# Patient Record
Sex: FEMALE | Race: WHITE | Employment: OTHER | URBAN - METROPOLITAN AREA
[De-identification: names, ages, dates, MRNs, and addresses within clinical notes are randomized per-mention and may not be internally consistent; named-entity substitution may affect disease eponyms.]

---

## 2017-01-11 ENCOUNTER — NURSE NAVIGATOR (OUTPATIENT)
Dept: FAMILY MEDICINE CLINIC | Age: 42
End: 2017-01-11

## 2018-06-18 ENCOUNTER — APPOINTMENT (OUTPATIENT)
Age: 43
DRG: 543 | End: 2018-06-18
Attending: EMERGENCY MEDICINE
Payer: COMMERCIAL

## 2018-06-18 ENCOUNTER — APPOINTMENT (OUTPATIENT)
Dept: GENERAL RADIOLOGY | Age: 43
DRG: 543 | End: 2018-06-18
Attending: EMERGENCY MEDICINE
Payer: COMMERCIAL

## 2018-06-18 ENCOUNTER — APPOINTMENT (OUTPATIENT)
Dept: CT IMAGING | Age: 43
DRG: 543 | End: 2018-06-18
Attending: EMERGENCY MEDICINE
Payer: COMMERCIAL

## 2018-06-18 ENCOUNTER — HOSPITAL ENCOUNTER (INPATIENT)
Age: 43
LOS: 1 days | Discharge: ANOTHER HOSPITAL | DRG: 543 | End: 2018-06-19
Attending: EMERGENCY MEDICINE | Admitting: HOSPITALIST
Payer: COMMERCIAL

## 2018-06-18 DIAGNOSIS — G95.29 SPINAL CORD COMPRESSION DUE TO MALIGNANT NEOPLASM METASTATIC TO SPINE (HCC): Primary | ICD-10-CM

## 2018-06-18 DIAGNOSIS — C79.51 SPINAL CORD COMPRESSION DUE TO MALIGNANT NEOPLASM METASTATIC TO SPINE (HCC): Primary | ICD-10-CM

## 2018-06-18 PROBLEM — C50.919 METASTATIC BREAST CANCER (HCC): Status: ACTIVE | Noted: 2018-06-18

## 2018-06-18 LAB
ANION GAP SERPL CALC-SCNC: 2 MMOL/L (ref 3–18)
BASOPHILS # BLD: 0.1 K/UL (ref 0–0.06)
BASOPHILS NFR BLD: 1 % (ref 0–2)
BUN SERPL-MCNC: 10 MG/DL (ref 7–18)
BUN/CREAT SERPL: 15 (ref 12–20)
CALCIUM SERPL-MCNC: 9 MG/DL (ref 8.5–10.1)
CHLORIDE SERPL-SCNC: 101 MMOL/L (ref 100–108)
CO2 SERPL-SCNC: 31 MMOL/L (ref 21–32)
CREAT SERPL-MCNC: 0.67 MG/DL (ref 0.6–1.3)
DIFFERENTIAL METHOD BLD: ABNORMAL
EOSINOPHIL # BLD: 0.2 K/UL (ref 0–0.4)
EOSINOPHIL NFR BLD: 2 % (ref 0–5)
ERYTHROCYTE [DISTWIDTH] IN BLOOD BY AUTOMATED COUNT: 16.5 % (ref 11.6–14.5)
GLUCOSE SERPL-MCNC: 89 MG/DL (ref 74–99)
HCT VFR BLD AUTO: 31.8 % (ref 35–45)
HGB BLD-MCNC: 10.1 G/DL (ref 12–16)
LYMPHOCYTES # BLD: 0.6 K/UL (ref 0.9–3.6)
LYMPHOCYTES NFR BLD: 8 % (ref 21–52)
MAGNESIUM SERPL-MCNC: 1.8 MG/DL (ref 1.6–2.6)
MCH RBC QN AUTO: 27.1 PG (ref 24–34)
MCHC RBC AUTO-ENTMCNC: 31.8 G/DL (ref 31–37)
MCV RBC AUTO: 85.3 FL (ref 74–97)
MONOCYTES # BLD: 1.1 K/UL (ref 0.05–1.2)
MONOCYTES NFR BLD: 14 % (ref 3–10)
NEUTS SEG # BLD: 6 K/UL (ref 1.8–8)
NEUTS SEG NFR BLD: 75 % (ref 40–73)
PLATELET # BLD AUTO: 567 K/UL (ref 135–420)
PMV BLD AUTO: 8.7 FL (ref 9.2–11.8)
POTASSIUM SERPL-SCNC: 4.8 MMOL/L (ref 3.5–5.5)
RBC # BLD AUTO: 3.73 M/UL (ref 4.2–5.3)
SODIUM SERPL-SCNC: 134 MMOL/L (ref 136–145)
WBC # BLD AUTO: 7.9 K/UL (ref 4.6–13.2)

## 2018-06-18 PROCEDURE — 74177 CT ABD & PELVIS W/CONTRAST: CPT

## 2018-06-18 PROCEDURE — 99284 EMERGENCY DEPT VISIT MOD MDM: CPT

## 2018-06-18 PROCEDURE — A9575 INJ GADOTERATE MEGLUMI 0.1ML: HCPCS | Performed by: EMERGENCY MEDICINE

## 2018-06-18 PROCEDURE — 74011250636 HC RX REV CODE- 250/636: Performed by: EMERGENCY MEDICINE

## 2018-06-18 PROCEDURE — 65270000029 HC RM PRIVATE

## 2018-06-18 PROCEDURE — 71046 X-RAY EXAM CHEST 2 VIEWS: CPT

## 2018-06-18 PROCEDURE — 74011250636 HC RX REV CODE- 250/636: Performed by: HOSPITALIST

## 2018-06-18 PROCEDURE — 74011250636 HC RX REV CODE- 250/636: Performed by: INTERNAL MEDICINE

## 2018-06-18 PROCEDURE — 83735 ASSAY OF MAGNESIUM: CPT | Performed by: EMERGENCY MEDICINE

## 2018-06-18 PROCEDURE — 72157 MRI CHEST SPINE W/O & W/DYE: CPT

## 2018-06-18 PROCEDURE — 74011636320 HC RX REV CODE- 636/320: Performed by: EMERGENCY MEDICINE

## 2018-06-18 PROCEDURE — 74011250637 HC RX REV CODE- 250/637: Performed by: HOSPITALIST

## 2018-06-18 PROCEDURE — 85025 COMPLETE CBC W/AUTO DIFF WBC: CPT | Performed by: EMERGENCY MEDICINE

## 2018-06-18 PROCEDURE — 80048 BASIC METABOLIC PNL TOTAL CA: CPT | Performed by: EMERGENCY MEDICINE

## 2018-06-18 RX ORDER — HEPARIN SODIUM 5000 [USP'U]/ML
5000 INJECTION, SOLUTION INTRAVENOUS; SUBCUTANEOUS EVERY 8 HOURS
Status: DISCONTINUED | OUTPATIENT
Start: 2018-06-18 | End: 2018-06-20 | Stop reason: HOSPADM

## 2018-06-18 RX ORDER — GABAPENTIN 300 MG/1
300 CAPSULE ORAL 3 TIMES DAILY
COMMUNITY

## 2018-06-18 RX ORDER — SODIUM CHLORIDE 9 MG/ML
50 INJECTION, SOLUTION INTRAVENOUS CONTINUOUS
Status: DISCONTINUED | OUTPATIENT
Start: 2018-06-18 | End: 2018-06-20 | Stop reason: HOSPADM

## 2018-06-18 RX ORDER — THERA TABS 400 MCG
1 TAB ORAL DAILY
Status: DISCONTINUED | OUTPATIENT
Start: 2018-06-19 | End: 2018-06-20 | Stop reason: HOSPADM

## 2018-06-18 RX ORDER — ONDANSETRON 2 MG/ML
4 INJECTION INTRAMUSCULAR; INTRAVENOUS
Status: DISCONTINUED | OUTPATIENT
Start: 2018-06-18 | End: 2018-06-20 | Stop reason: HOSPADM

## 2018-06-18 RX ORDER — SODIUM CHLORIDE 9 MG/ML
500 INJECTION, SOLUTION INTRAVENOUS ONCE
Status: COMPLETED | OUTPATIENT
Start: 2018-06-19 | End: 2018-06-18

## 2018-06-18 RX ORDER — IBUPROFEN 400 MG/1
400 TABLET ORAL
Status: DISCONTINUED | OUTPATIENT
Start: 2018-06-18 | End: 2018-06-19

## 2018-06-18 RX ORDER — DEXAMETHASONE SODIUM PHOSPHATE 4 MG/ML
4 INJECTION, SOLUTION INTRA-ARTICULAR; INTRALESIONAL; INTRAMUSCULAR; INTRAVENOUS; SOFT TISSUE
Status: COMPLETED | OUTPATIENT
Start: 2018-06-18 | End: 2018-06-18

## 2018-06-18 RX ORDER — GABAPENTIN 300 MG/1
300 CAPSULE ORAL 3 TIMES DAILY
Status: DISCONTINUED | OUTPATIENT
Start: 2018-06-18 | End: 2018-06-19

## 2018-06-18 RX ORDER — NAPROXEN 250 MG/1
500 TABLET ORAL
Status: DISCONTINUED | OUTPATIENT
Start: 2018-06-18 | End: 2018-06-18

## 2018-06-18 RX ORDER — ACETAMINOPHEN 325 MG/1
650 TABLET ORAL
Status: DISCONTINUED | OUTPATIENT
Start: 2018-06-18 | End: 2018-06-20 | Stop reason: HOSPADM

## 2018-06-18 RX ORDER — DEXAMETHASONE SODIUM PHOSPHATE 4 MG/ML
4 INJECTION, SOLUTION INTRA-ARTICULAR; INTRALESIONAL; INTRAMUSCULAR; INTRAVENOUS; SOFT TISSUE EVERY 6 HOURS
Status: DISCONTINUED | OUTPATIENT
Start: 2018-06-18 | End: 2018-06-20 | Stop reason: HOSPADM

## 2018-06-18 RX ORDER — GADOTERATE MEGLUMINE 376.9 MG/ML
11 INJECTION INTRAVENOUS
Status: COMPLETED | OUTPATIENT
Start: 2018-06-18 | End: 2018-06-18

## 2018-06-18 RX ADMIN — DEXAMETHASONE SODIUM PHOSPHATE 4 MG: 4 INJECTION, SOLUTION INTRAMUSCULAR; INTRAVENOUS at 23:30

## 2018-06-18 RX ADMIN — IBUPROFEN 400 MG: 400 TABLET ORAL at 21:16

## 2018-06-18 RX ADMIN — GABAPENTIN 300 MG: 300 CAPSULE ORAL at 21:16

## 2018-06-18 RX ADMIN — SODIUM CHLORIDE 75 ML/HR: 900 INJECTION, SOLUTION INTRAVENOUS at 21:00

## 2018-06-18 RX ADMIN — IOPAMIDOL 100 ML: 612 INJECTION, SOLUTION INTRAVENOUS at 08:54

## 2018-06-18 RX ADMIN — GADOTERATE MEGLUMINE 11 ML: 376.9 INJECTION INTRAVENOUS at 15:40

## 2018-06-18 RX ADMIN — DEXAMETHASONE SODIUM PHOSPHATE 4 MG: 4 INJECTION, SOLUTION INTRAMUSCULAR; INTRAVENOUS at 14:42

## 2018-06-18 RX ADMIN — SODIUM CHLORIDE 500 ML: 900 INJECTION, SOLUTION INTRAVENOUS at 23:45

## 2018-06-18 NOTE — ED PROVIDER NOTES
EMERGENCY DEPARTMENT HISTORY AND PHYSICAL EXAM    7:50 AM      Date: 6/18/2018  Patient Name: Marcia Deluna    History of Presenting Illness     Chief Complaint   Patient presents with    Back Pain    Numbness     feet         History Provided By: Patient    Chief Complaint: Back Pain   Duration: 2 Weeks  Timing:  Constant  Location: mid back radiated around to rib cage   Quality: Aching  Severity: Moderate  Modifying Factors: Tried Gabapentin and \"light medication\"   Associated Symptoms: numbness (toes), unstable gait       Additional History (Context): Marcia Deluna is a 36 y.o. female with PMHx of hypothyroid, back pain, breast cancer, and pleural effusion who presents with constant moderate aching back pain onset x 2 weeks ago. The pain starts in her mid back and radiates to her rib cage. She has not followed up with a Physician for her back. Tried Gabapentin x 1 week ago and a \"light medication\" (unsure of name), however has had no relief of Sx. Pt thought that her back pain was related to Hx of arthritis. She previously had a CT scan x 2 years ago which showed \"bone thinning\". Associated Sx include numbness in her toes that started \"this morning\" and an unstable gait. Pt states that x 1 day ago her legs became \"wobbly\" and started to TROMSØ out on her\". She states that she now needs assistance with walking. Pt has Hx of breast cancer x 4 years ago and underwent chemotherapy and radiation. She also had her ovaries removed. Pt reports being diagnosed with pleural effusion and she now had drains inserted. Pt states that her last X-rays were x 2 months ago when she was hospitalized for the pleural effusion. Denies abd pain, urinary dysfunction, problems with bowel movements. Denies any further complaints or symptoms at the moment.          Dr. Mylo Ben Dr. Candee Sacks- Oncologist   Dr. Mio Azar- Surgeon   PCP: Luis Encarnacion MD    Current Facility-Administered Medications   Medication Dose Route Frequency Provider Last Rate Last Dose    dexamethasone (DECADRON) 4 mg/mL injection 4 mg  4 mg IntraVENous NOW Cosme Awad MD         Current Outpatient Prescriptions   Medication Sig Dispense Refill    gabapentin (NEURONTIN) 300 mg capsule Take 300 mg by mouth three (3) times daily.  B.infantis-B.ani-B.long-B.bifi (PROBIOTIC 4X) 10-15 mg TbEC Take  by mouth.  naproxen (NAPROSYN) 500 mg tablet Take 1 Tab by mouth two (2) times daily (with meals). 30 Tab 0    multivitamin (ONE A DAY) tablet Take 1 Tab by mouth daily. Past History     Past Medical History:  Past Medical History:   Diagnosis Date    Back pain     Dr. Adelaida edmondson, discogenic low back pain,exercises    Breast CA ([facility] [address] 2014    Dr. Stone Mast Hypothyroid 2010    Pleural effusion     Postmastectomy lymphedema syndrome     right arm       Past Surgical History:  Past Surgical History:   Procedure Laterality Date    HX BREAST REDUCTION      HX  SECTION      x 4    HX MASTECTOMY Right 2014    Dr. Maxim Chaudhari       Family History:  Family History   Problem Relation Age of Onset    Breast Cancer Maternal Aunt     Cancer Mother      ovarian    Alcohol abuse Father     Heart Disease Father     Hypertension Father        Social History:  Social History   Substance Use Topics    Smoking status: Never Smoker    Smokeless tobacco: None    Alcohol use No       Allergies:  No Known Allergies      Review of Systems       Review of Systems   Constitutional: Negative for chills, fatigue and fever. HENT: Negative. Negative for sore throat. Eyes: Negative. Respiratory: Negative for cough and shortness of breath. Cardiovascular: Negative for chest pain and palpitations. Gastrointestinal: Negative for abdominal pain, nausea and vomiting. Genitourinary: Negative for difficulty urinating and dysuria. Musculoskeletal: Positive for back pain and gait problem. Skin: Negative. Neurological: Positive for numbness. Negative for dizziness, weakness, light-headedness and headaches. Psychiatric/Behavioral: Negative. All other systems reviewed and are negative. Physical Exam     Visit Vitals    /69 (BP 1 Location: Left arm)    Pulse 99    Temp 98.1 °F (36.7 °C)    Resp 18    Ht 5' 7\" (1.702 m)    Wt 56.7 kg (125 lb)    SpO2 99%    BMI 19.58 kg/m2         Physical Exam   Constitutional: She is oriented to person, place, and time. She appears well-developed and well-nourished. HENT:   Head: Normocephalic and atraumatic. Mouth/Throat: Oropharynx is clear and moist.   Eyes: Conjunctivae are normal. Pupils are equal, round, and reactive to light. No scleral icterus. Neck: Normal range of motion. Neck supple. No JVD present. No tracheal deviation present. Cardiovascular: Normal rate, regular rhythm and normal heart sounds. Pulmonary/Chest: No respiratory distress. She has no wheezes. Positive for decreased breath sounds at the bases bilaterally. Abdominal: Soft. Bowel sounds are normal.   Musculoskeletal: Normal range of motion. Neurological: She is alert and oriented to person, place, and time. She has normal strength. No cranial nerve deficit. Gait normal. GCS eye subscore is 4. GCS verbal subscore is 5. GCS motor subscore is 6. Pt has decreased muscle strength in legs bilaterally, can lift legs off bed but can't hold them against pressure. Skin: Skin is warm and dry. Psychiatric: She has a normal mood and affect. Nursing note and vitals reviewed.         Diagnostic Study Results     Labs -  Recent Results (from the past 12 hour(s))   METABOLIC PANEL, BASIC    Collection Time: 06/18/18  8:15 AM   Result Value Ref Range    Sodium 134 (L) 136 - 145 mmol/L    Potassium 4.8 3.5 - 5.5 mmol/L    Chloride 101 100 - 108 mmol/L    CO2 31 21 - 32 mmol/L    Anion gap 2 (L) 3.0 - 18 mmol/L    Glucose 89 74 - 99 mg/dL    BUN 10 7.0 - 18 MG/DL    Creatinine 0. 67 0.6 - 1.3 MG/DL    BUN/Creatinine ratio 15 12 - 20      GFR est AA >60 >60 ml/min/1.73m2    GFR est non-AA >60 >60 ml/min/1.73m2    Calcium 9.0 8.5 - 10.1 MG/DL   CBC WITH AUTOMATED DIFF    Collection Time: 06/18/18  8:15 AM   Result Value Ref Range    WBC 7.9 4.6 - 13.2 K/uL    RBC 3.73 (L) 4.20 - 5.30 M/uL    HGB 10.1 (L) 12.0 - 16.0 g/dL    HCT 31.8 (L) 35.0 - 45.0 %    MCV 85.3 74.0 - 97.0 FL    MCH 27.1 24.0 - 34.0 PG    MCHC 31.8 31.0 - 37.0 g/dL    RDW 16.5 (H) 11.6 - 14.5 %    PLATELET 829 (H) 422 - 420 K/uL    MPV 8.7 (L) 9.2 - 11.8 FL    NEUTROPHILS 75 (H) 40 - 73 %    LYMPHOCYTES 8 (L) 21 - 52 %    MONOCYTES 14 (H) 3 - 10 %    EOSINOPHILS 2 0 - 5 %    BASOPHILS 1 0 - 2 %    ABS. NEUTROPHILS 6.0 1.8 - 8.0 K/UL    ABS. LYMPHOCYTES 0.6 (L) 0.9 - 3.6 K/UL    ABS. MONOCYTES 1.1 0.05 - 1.2 K/UL    ABS. EOSINOPHILS 0.2 0.0 - 0.4 K/UL    ABS. BASOPHILS 0.1 (H) 0.0 - 0.06 K/UL    DF AUTOMATED     MAGNESIUM    Collection Time: 06/18/18  8:15 AM   Result Value Ref Range    Magnesium 1.8 1.6 - 2.6 mg/dL       Radiologic Studies -   CT CHEST ABD PELV W CONT   Final Result      XR CHEST PA LAT   Final Result      CT Chest Abd Pelv  IMPRESSION:     1. Extensive adenopathy (mediastinum, axillary, para-aortic, retrocrural,  pericardial, retroperitoneal, mesenteric, and groin regions). If tissue  sampling is desired, the right groin region nodes may be the most readily  accessible nodes for sampling.     - Metastasis vs. myeloproliferative or lymphoproliferative disease.     2.  Lytic lesions in the skeletal structures. Similar differential  considerations.     3. Pleural effusion despite adequate position of the pleural catheters.     4.  Large amount of pericardial effusion. Adenopathy in the pericardial fat  pad. These lesions may be contributing to the pericardial effusion.     5.  Mild pleural effusion.     6.   Of particular note is the spinal canal at T5 and T6 level which appears to  be encroached by the compressive changes as well as mass extending from the  lytic foci within the vertebra into the spinal canal.  MR would be helpful for  further delineation and to assess for the extent of spinal canal encroachment.     7.  Low confidence finding of paucity of enhancement in the hepatic vein despite  enhancement in the iliac veins and IVC. Possibly result of slow portal venous  flow vs. reduced cardiac function vs. hepatic venous thrombosis      CXR   IMPRESSION:     1. Moderate left-sided pleural effusion despite pleural catheter placement. 2.  Minimal right costophrenic angle blunting with the right-sided pleural  catheter placement. 3.  Marked enlargement of the cardiac silhouette. Medical Decision Making   I am the first provider for this patient. I reviewed the vital signs, available nursing notes, past medical history, past surgical history, family history and social history. Vital Signs-Reviewed the patient's vital signs. Pulse Oximetry Analysis -  99 % on RA, normal     Records Reviewed: Nursing Notes (Time of Review: 7:50 AM)    ED Course: Progress Notes, Reevaluation, and Consults:    1:31 PM Consult: I discussed care with Dr. Zeke Barajas (Radiation Oncologist). It was a standard discussion including patient history, chief complaint, available diagnostic results, and predicted treatment course. Recommends hospital admission for further care. 2:23 PM Consult: I discussed care with Dr. Diana Wilkerson (Oncologist). It was a standard discussion including patient history, chief complaint, available diagnostic results, and predicted treatment course. Agrees to consult with patient. 2:26 PM Consult: I discussed care with Dr. Ricci Mayes (Hospitalist). It was a standard discussion including patient history, chief complaint, available diagnostic results, and predicted treatment course. Agrees to accept pt.            Provider Notes (Medical Decision Making):     Diagnosis     Clinical Impression: No diagnosis found. Disposition: admitted. Follow-up Information     None           Patient's Medications   Start Taking    No medications on file   Continue Taking    B. INFANTIS-B. ANI-B. LONG-B.BIFI (PROBIOTIC 4X) 10-15 MG TBEC    Take  by mouth. GABAPENTIN (NEURONTIN) 300 MG CAPSULE    Take 300 mg by mouth three (3) times daily. MULTIVITAMIN (ONE A DAY) TABLET    Take 1 Tab by mouth daily. NAPROXEN (NAPROSYN) 500 MG TABLET    Take 1 Tab by mouth two (2) times daily (with meals). These Medications have changed    No medications on file   Stop Taking    EXEMESTANE PO    Take  by mouth. LEUPROLIDE ACETATE (LUPRON DEPOT IM)    by IntraMUSCular route. LEVOTHYROXINE (SYNTHROID) 150 MCG TABLET    Take  by mouth Daily (before breakfast). MAGNESIUM 250 MG TAB    Take  by mouth.     _______________________________    Attestations:  Scribe Attestation     Baron Petersen (Aj) acting as a scribe for and in the presence of Palmer Dobson MD      June 18, 2018 at 7:50 AM       Provider Attestation:      I personally performed the services described in the documentation, reviewed the documentation, as recorded by the scribe in my presence, and it accurately and completely records my words and actions. June 18, 2018 at 7:50 AM - Palmer Dobson MD    _______________________________    Evangelina Pagan discussed pt with Dr Adelita Ann (oncology) and Dr Donis Castañeda (rad oncology), they both recommend hospital admit for further care and treatment of spinal cord compression from what is assumed to be metastatic disease at this time. Dr Adelita Ann req starting pt on 4mg IV Decadron q 8 hours. Dr Donis Castañeda will evaluate pt in the hospital and will proceed from there. Pt and her  understand and agree with dispo and F/U plan.   Palmer Dobson MD  2:38 PM

## 2018-06-18 NOTE — ED TRIAGE NOTES
Pt presents with back pain x 2-3 weeks and bilateral lower leg and foot numbness x 3 days. Pt states hx of osteoporosis. Pt states numbness is causing her not to be able to walk. Pt ambulated from wheelchair to stretcher without difficulty.

## 2018-06-18 NOTE — PROGRESS NOTES
Problem: Falls - Risk of  Goal: *Absence of Falls  Document Fermin Fall Risk and appropriate interventions in the flowsheet.    Outcome: Progressing Towards Goal  Fall Risk Interventions:  Mobility Interventions: PT Consult for mobility concerns, OT consult for ADLs

## 2018-06-18 NOTE — ED NOTES
Inpatient receiving nurse, Toña Camacho, made aware that patient is requesting PleurX catheter be made available for her stay.

## 2018-06-18 NOTE — H&P
HISTORY & PHYSICAL            Patient: Siva Camp MRN: [id number]  CSN: [id number]    YOB: 1975  Age: 36 y.o. Sex: female    DOA: 6/18/2018 LOS:  LOS: 0 days        DOA: 6/18/2018        Assessment/Plan     Active Problems:    Spinal cord compression due to malignant neoplasm metastatic to spine ([address] (6/18/2018)      Metastatic breast cancer ([address] (6/18/2018)        Plan:  1. Spinal cord compression d2 Metastatic breast ca - Radiation Oncology - Dr Belle Pruitt consulted from ER - will see pt today , Oncology Dr Manuelito Ballesteros also consulted - CT Thorax & MRI thorax reviewed - show extensive cord compression at T5 T6 level - will start IV steroids. Will also benefit from Neurosurgical eval - will defer to primary team but did discuss with pt   2. H/o Metastatic breast cancer with primary in R breast   3.  Malignant Pleural effusion 2y to metastatic cancer - has bilateral pleurex catheters & mentions would need to be drained   DVT px - heparin   FC           Severity of Signs & Symptoms -- Moderate  Risk of adverse events -- High  Current Medical Rx Plan - As Above   Patient history & comorbidities - Per HPI  Discharge Plan -- Home            HPI:     Siva Camp is a 36 y.o. female who is being admitted with bilateral LE weakness -- she mentions that she noticed tingling in her legs which started yesterday, she also feels wobbly when walking-- she has a h/o R Breast cancer -- last Rx in 2014 -- she mentions she was dx'd with mets in 2016 - went to [address] was advised Chemo but tried Alternative Rx   Er eval - CT scan shows T5 T6 mass with spinal cord compression , MRI shows severe cord compression - Radiation Oncology consulted Dr Belle Pruitt , Oncology consulted - Dr Manuelito Ballesteros would benefit from Neurosurg / Spine Surg consult   Will admit to hosp for further eval       Past Medical History:   Diagnosis Date    Back pain     Dr. Charlie Piper prn, discogenic low back pain,exercises    Breast CA ([facility] [address] 12/22/2014     Aleida Zambrano    Hypothyroid 2010    Pleural effusion     Postmastectomy lymphedema syndrome     right arm       Past Surgical History:   Procedure Laterality Date    HX BREAST REDUCTION      HX  SECTION      x 4    HX MASTECTOMY Right 2014    Dr. Teresa Chan       Family History   Problem Relation Age of Onset    Breast Cancer Maternal Aunt     Cancer Mother      ovarian    Alcohol abuse Father     Heart Disease Father     Hypertension Father        Social History     Social History    Marital status:      Spouse name: N/A    Number of children: N/A    Years of education: N/A     Social History Main Topics    Smoking status: Never Smoker    Smokeless tobacco: None    Alcohol use No    Drug use: No    Sexual activity: Not Asked     Other Topics Concern    None     Social History Narrative       Prior to Admission medications    Medication Sig Start Date End Date Taking? Authorizing Provider   gabapentin (NEURONTIN) 300 mg capsule Take 300 mg by mouth three (3) times daily. Yes Phys Other, MD   B.infantis-B.ani-B.long-RDbifi (PROBIOTIC 4X) 10-15 mg TbEC Take  by mouth. Yes Phys Other, MD   naproxen (NAPROSYN) 500 mg tablet Take 1 Tab by mouth two (2) times daily (with meals). 3/2/15  Yes BRINA Sanches   multivitamin (ONE A DAY) tablet Take 1 Tab by mouth daily. Yes Historical Provider       No Known Allergies    Review of Systems  A comprehensive review of systems was negative except for that written in the History of Present Illness. Physical Exam:      Visit Vitals    BP (!) 78/51    Pulse (!) 108    Temp 97.6 °F (36.4 °C)    Resp 18    Ht 5' 7\" (1.702 m)    Wt 56.7 kg (125 lb)    SpO2 95%    Breastfeeding No    BMI 19.58 kg/m2       Physical Exam:    Gen: In general, this is a well nourished female in no acute distress  HEENT: Sclerae nonicteric. Oral mucous membranes moist. Dentition normal  Neck: Supple with midline trachea.    CV: RRR without murmur or rub appreciated. Resp:Respirations are unlabored without use of accessory muscles. Decreased BS bilaterally 2y to Malignant Pleural effusion. Abd: Soft, nontender, nondistended. Extrem: Extremities are warm, without cyanosis or clubbing. No pitting pretibial edema. Skin: Warm, no visible rashes. Neuro: Patient is alert, oriented, and cooperative. No obvious focal defects. Moves all 4 extremities. Labs Reviewed:    Recent Results (from the past 24 hour(s))   METABOLIC PANEL, BASIC    Collection Time: 06/18/18  8:15 AM   Result Value Ref Range    Sodium 134 (L) 136 - 145 mmol/L    Potassium 4.8 3.5 - 5.5 mmol/L    Chloride 101 100 - 108 mmol/L    CO2 31 21 - 32 mmol/L    Anion gap 2 (L) 3.0 - 18 mmol/L    Glucose 89 74 - 99 mg/dL    BUN 10 7.0 - 18 MG/DL    Creatinine 0.67 0.6 - 1.3 MG/DL    BUN/Creatinine ratio 15 12 - 20      GFR est AA >60 >60 ml/min/1.73m2    GFR est non-AA >60 >60 ml/min/1.73m2    Calcium 9.0 8.5 - 10.1 MG/DL   CBC WITH AUTOMATED DIFF    Collection Time: 06/18/18  8:15 AM   Result Value Ref Range    WBC 7.9 4.6 - 13.2 K/uL    RBC 3.73 (L) 4.20 - 5.30 M/uL    HGB 10.1 (L) 12.0 - 16.0 g/dL    HCT 31.8 (L) 35.0 - 45.0 %    MCV 85.3 74.0 - 97.0 FL    MCH 27.1 24.0 - 34.0 PG    MCHC 31.8 31.0 - 37.0 g/dL    RDW 16.5 (H) 11.6 - 14.5 %    PLATELET 366 (H) 915 - 420 K/uL    MPV 8.7 (L) 9.2 - 11.8 FL    NEUTROPHILS 75 (H) 40 - 73 %    LYMPHOCYTES 8 (L) 21 - 52 %    MONOCYTES 14 (H) 3 - 10 %    EOSINOPHILS 2 0 - 5 %    BASOPHILS 1 0 - 2 %    ABS. NEUTROPHILS 6.0 1.8 - 8.0 K/UL    ABS. LYMPHOCYTES 0.6 (L) 0.9 - 3.6 K/UL    ABS. MONOCYTES 1.1 0.05 - 1.2 K/UL    ABS. EOSINOPHILS 0.2 0.0 - 0.4 K/UL    ABS. BASOPHILS 0.1 (H) 0.0 - 0.06 K/UL    DF AUTOMATED     MAGNESIUM    Collection Time: 06/18/18  8:15 AM   Result Value Ref Range    Magnesium 1.8 1.6 - 2.6 mg/dL       Imaging Reviewed:    CT chest / Abd / Pelvis   IMPRESSION:     1.   Extensive adenopathy (mediastinum, axillary, para-aortic, retrocrural,  pericardial, retroperitoneal, mesenteric, and groin regions). If tissue  sampling is desired, the right groin region nodes may be the most readily  accessible nodes for sampling.     - Metastasis vs. myeloproliferative or lymphoproliferative disease.     2.  Lytic lesions in the skeletal structures. Similar differential  considerations.     3. Pleural effusion despite adequate position of the pleural catheters.     4.  Large amount of pericardial effusion. Adenopathy in the pericardial fat  pad. These lesions may be contributing to the pericardial effusion.     5.  Mild pleural effusion.     6. Of particular note is the spinal canal at T5 and T6 level which appears to  be encroached by the compressive changes as well as mass extending from the  lytic foci within the vertebra into the spinal canal.  MR would be helpful for  further delineation and to assess for the extent of spinal canal encroachment.     7.  Low confidence finding of paucity of enhancement in the hepatic vein despite  enhancement in the iliac veins and IVC. Possibly result of slow portal venous  flow vs. reduced cardiac function vs. hepatic venous thrombosis.         MRI Thoracic Spine   IMPRESSION:     1. Expansile osseous metastatic tumor at multiple thoracic levels  -Extensive extra spinous metastatic disease, best evaluated on the CT chest  abdomen pelvis earlier today  2. There is maximal canal compromise, up to severe cord compression, T5 and T6. Presumed high risk for imminent cord vascular compromise/paraparesis  -Significantly lesser tumoral bone encroachment on canal T3  -Tumor encroaches on some exit foramen  -For correlation sagittal scans with radiotherapy, the most collapsed vertebral  body is T5 (65 % height loss); the T3 vertebral body has about 45 % height loss  3. Very probable edema within spinal cord T5-T7     I discussed much of the above with Dr. Wero Mandujano.  Confirmed.     Thank you for this referral.          Marie Shahid MD  6/18/2018, 7:15 PM

## 2018-06-18 NOTE — ED NOTES
Pt request assistance to bathroom via wheelchair. Pt able to transfer to wheelchair without assistance. Pt taken to bathroom via wheelchair.

## 2018-06-19 ENCOUNTER — HOSPITAL ENCOUNTER (OUTPATIENT)
Dept: RADIATION THERAPY | Age: 43
Discharge: HOME | End: 2018-06-19

## 2018-06-19 ENCOUNTER — APPOINTMENT (OUTPATIENT)
Dept: NON INVASIVE DIAGNOSTICS | Age: 43
DRG: 543 | End: 2018-06-19
Attending: INTERNAL MEDICINE
Payer: COMMERCIAL

## 2018-06-19 ENCOUNTER — APPOINTMENT (OUTPATIENT)
Dept: MRI IMAGING | Age: 43
DRG: 543 | End: 2018-06-19
Attending: INTERNAL MEDICINE
Payer: COMMERCIAL

## 2018-06-19 VITALS
HEIGHT: 67 IN | HEART RATE: 99 BPM | WEIGHT: 125 LBS | RESPIRATION RATE: 18 BRPM | DIASTOLIC BLOOD PRESSURE: 77 MMHG | OXYGEN SATURATION: 94 % | BODY MASS INDEX: 19.62 KG/M2 | TEMPERATURE: 97.9 F | SYSTOLIC BLOOD PRESSURE: 116 MMHG

## 2018-06-19 LAB
ALBUMIN SERPL-MCNC: 1.8 G/DL (ref 3.4–5)
ALBUMIN/GLOB SERPL: 0.4 {RATIO} (ref 0.8–1.7)
ALP SERPL-CCNC: 90 U/L (ref 45–117)
ALT SERPL-CCNC: 17 U/L (ref 13–56)
ANION GAP SERPL CALC-SCNC: 10 MMOL/L (ref 3–18)
APTT PPP: 31.4 SEC (ref 23–36.4)
AST SERPL-CCNC: 27 U/L (ref 15–37)
BILIRUB SERPL-MCNC: 0.1 MG/DL (ref 0.2–1)
BUN SERPL-MCNC: 11 MG/DL (ref 7–18)
BUN/CREAT SERPL: 17 (ref 12–20)
CALCIUM SERPL-MCNC: 8.2 MG/DL (ref 8.5–10.1)
CHLORIDE SERPL-SCNC: 105 MMOL/L (ref 100–108)
CHOLEST SERPL-MCNC: 121 MG/DL
CO2 SERPL-SCNC: 25 MMOL/L (ref 21–32)
CREAT SERPL-MCNC: 0.64 MG/DL (ref 0.6–1.3)
ECHO AO ROOT DIAM: 3.41 CM
ECHO IVC PROX: 3 CM
ECHO LA AREA 4C: 11.9 CM2
ECHO LA VOL 2C: 22.65 ML (ref 22–52)
ECHO LA VOL 4C: 25.03 ML (ref 22–52)
ECHO LA VOLUME INDEX A2C: 13.68 ML/M2
ECHO LA VOLUME INDEX A4C: 15.12 ML/M2
ECHO LV INTERNAL DIMENSION DIASTOLIC: 4.47 CM (ref 3.9–5.3)
ECHO LV INTERNAL DIMENSION SYSTOLIC: 2.8 CM
ECHO LV ISOVOLUMETRIC RELAXATION TIME (IVRT): 78 MS
ECHO LV IVSD: 1.12 CM (ref 0.6–0.9)
ECHO LV MASS 2D: 192.4 G (ref 67–162)
ECHO LV MASS INDEX 2D: 116.2 G/M2 (ref 43–95)
ECHO LV POSTERIOR WALL DIASTOLIC: 1.01 CM (ref 0.6–0.9)
ECHO LVOT DIAM: 2.13 CM
ECHO LVOT PEAK GRADIENT: 3.8 MMHG
ECHO LVOT PEAK VELOCITY: 97.15 CM/S
ECHO LVOT SV: 64.3 ML
ECHO LVOT VTI: 18.03 CM
ECHO MV A VELOCITY: 101.36 CM/S
ECHO MV E DECELERATION TIME (DT): 104 MS
ECHO MV E VELOCITY: 0.75 CM/S
ECHO MV E/A RATIO: 0.7
ECHO RV TAPSE: 1.39 CM (ref 1.5–2)
ERYTHROCYTE [DISTWIDTH] IN BLOOD BY AUTOMATED COUNT: 16.7 % (ref 11.6–14.5)
GLOBULIN SER CALC-MCNC: 5.1 G/DL (ref 2–4)
GLUCOSE SERPL-MCNC: 137 MG/DL (ref 74–99)
HCT VFR BLD AUTO: 31 % (ref 35–45)
HDLC SERPL-MCNC: 44 MG/DL (ref 40–60)
HDLC SERPL: 2.8 {RATIO} (ref 0–5)
HGB BLD-MCNC: 10 G/DL (ref 12–16)
LDLC SERPL CALC-MCNC: 63.2 MG/DL (ref 0–100)
LIPID PROFILE,FLP: NORMAL
MCH RBC QN AUTO: 27 PG (ref 24–34)
MCHC RBC AUTO-ENTMCNC: 32.3 G/DL (ref 31–37)
MCV RBC AUTO: 83.8 FL (ref 74–97)
PLATELET # BLD AUTO: 609 K/UL (ref 135–420)
PMV BLD AUTO: 9.3 FL (ref 9.2–11.8)
POTASSIUM SERPL-SCNC: 4.6 MMOL/L (ref 3.5–5.5)
PROT SERPL-MCNC: 6.9 G/DL (ref 6.4–8.2)
RBC # BLD AUTO: 3.7 M/UL (ref 4.2–5.3)
SODIUM SERPL-SCNC: 140 MMOL/L (ref 136–145)
TRIGL SERPL-MCNC: 69 MG/DL (ref ?–150)
VLDLC SERPL CALC-MCNC: 13.8 MG/DL
WBC # BLD AUTO: 10.3 K/UL (ref 4.6–13.2)

## 2018-06-19 PROCEDURE — 80053 COMPREHEN METABOLIC PANEL: CPT | Performed by: HOSPITALIST

## 2018-06-19 PROCEDURE — 93306 TTE W/DOPPLER COMPLETE: CPT

## 2018-06-19 PROCEDURE — 74011250636 HC RX REV CODE- 250/636: Performed by: INTERNAL MEDICINE

## 2018-06-19 PROCEDURE — 86300 IMMUNOASSAY TUMOR CA 15-3: CPT | Performed by: INTERNAL MEDICINE

## 2018-06-19 PROCEDURE — 74011250637 HC RX REV CODE- 250/637: Performed by: HOSPITALIST

## 2018-06-19 PROCEDURE — A9577 INJ MULTIHANCE: HCPCS | Performed by: INTERNAL MEDICINE

## 2018-06-19 PROCEDURE — 74011250636 HC RX REV CODE- 250/636: Performed by: HOSPITALIST

## 2018-06-19 PROCEDURE — 80061 LIPID PANEL: CPT | Performed by: HOSPITALIST

## 2018-06-19 PROCEDURE — 36415 COLL VENOUS BLD VENIPUNCTURE: CPT | Performed by: HOSPITALIST

## 2018-06-19 PROCEDURE — 70553 MRI BRAIN STEM W/O & W/DYE: CPT

## 2018-06-19 PROCEDURE — 85730 THROMBOPLASTIN TIME PARTIAL: CPT | Performed by: HOSPITALIST

## 2018-06-19 PROCEDURE — 85027 COMPLETE CBC AUTOMATED: CPT | Performed by: HOSPITALIST

## 2018-06-19 PROCEDURE — 74011250637 HC RX REV CODE- 250/637: Performed by: INTERNAL MEDICINE

## 2018-06-19 RX ORDER — IBUPROFEN 400 MG/1
400 TABLET ORAL
Status: DISCONTINUED | OUTPATIENT
Start: 2018-06-19 | End: 2018-06-20 | Stop reason: HOSPADM

## 2018-06-19 RX ORDER — DEXAMETHASONE SODIUM PHOSPHATE 4 MG/ML
4 INJECTION, SOLUTION INTRA-ARTICULAR; INTRALESIONAL; INTRAMUSCULAR; INTRAVENOUS; SOFT TISSUE EVERY 6 HOURS
Qty: 1 VIAL | Refills: 0 | Status: SHIPPED
Start: 2018-06-20

## 2018-06-19 RX ORDER — HEPARIN SODIUM 5000 [USP'U]/ML
5000 INJECTION, SOLUTION INTRAVENOUS; SUBCUTANEOUS EVERY 8 HOURS
Qty: 1 ML | Refills: 1 | Status: SHIPPED
Start: 2018-06-19

## 2018-06-19 RX ORDER — ACETAMINOPHEN 325 MG/1
650 TABLET ORAL
Qty: 30 TAB | Refills: 0 | Status: SHIPPED
Start: 2018-06-19

## 2018-06-19 RX ORDER — GABAPENTIN 300 MG/1
300 CAPSULE ORAL EVERY 8 HOURS
Status: DISCONTINUED | OUTPATIENT
Start: 2018-06-19 | End: 2018-06-20 | Stop reason: HOSPADM

## 2018-06-19 RX ORDER — ONDANSETRON 2 MG/ML
4 INJECTION INTRAMUSCULAR; INTRAVENOUS
Qty: 1 VIAL | Refills: 0 | Status: SHIPPED
Start: 2018-06-19

## 2018-06-19 RX ORDER — IBUPROFEN 400 MG/1
400 TABLET ORAL
Qty: 30 TAB | Refills: 1 | Status: SHIPPED
Start: 2018-06-19

## 2018-06-19 RX ADMIN — GADOBENATE DIMEGLUMINE 11 ML: 529 INJECTION, SOLUTION INTRAVENOUS at 18:21

## 2018-06-19 RX ADMIN — GABAPENTIN 300 MG: 300 CAPSULE ORAL at 05:51

## 2018-06-19 RX ADMIN — GABAPENTIN 300 MG: 300 CAPSULE ORAL at 22:27

## 2018-06-19 RX ADMIN — GABAPENTIN 300 MG: 300 CAPSULE ORAL at 13:39

## 2018-06-19 RX ADMIN — DEXAMETHASONE SODIUM PHOSPHATE 4 MG: 4 INJECTION, SOLUTION INTRAMUSCULAR; INTRAVENOUS at 13:39

## 2018-06-19 RX ADMIN — DEXAMETHASONE SODIUM PHOSPHATE 4 MG: 4 INJECTION, SOLUTION INTRAMUSCULAR; INTRAVENOUS at 17:29

## 2018-06-19 RX ADMIN — IBUPROFEN 400 MG: 400 TABLET ORAL at 04:11

## 2018-06-19 RX ADMIN — DEXAMETHASONE SODIUM PHOSPHATE 4 MG: 4 INJECTION, SOLUTION INTRAMUSCULAR; INTRAVENOUS at 05:52

## 2018-06-19 RX ADMIN — SODIUM CHLORIDE 75 ML/HR: 900 INJECTION, SOLUTION INTRAVENOUS at 01:41

## 2018-06-19 NOTE — ROUTINE PROCESS
Rec'd pt resting in bed family at bedside. Dr Huang Dec here to see pt. Both pleurex cath need to be drained. 1945 Obtained a vacuum bottles for pleurex cath- Pt able to drain 800 ml to right chest and 300 ml to left chest. Pt stated she had the pleurex cath inserted last May 2018 at [facility]. Pt drained her own pleurex cath daily at home and she has her own supply ( as pt stated).

## 2018-06-19 NOTE — PROGRESS NOTES
Radiation Oncology    Patient interviewed and examined briefly. Chart and images reviewed. 37 YO multiparous woman diagnosed in 2014 with T2 N1 multifocal right breast cancer. She had neoadjuvant systemic chemotherapy for ER+/UT+/ Her-2/senait negative disease. It progressed with chemotherapy and was ypT3 ypN3 at the time of mastectomy later in 2014. She came for radiation therapy to the right chest wall/tissue expander and regional lymph nodes completing that in Feb 2015. This was followed by reconstruction. She apparently did well for a while stating that her restaging studies later in 2015 showed no evidence of disease. There was local recurrence about one year later. She had a second surgeon care for her and sought second medical oncology opinion at that time. Per her report she did not proceed with standard therapy but had consultation and ongoing treatment with an alternative medicine professional in [place], [place]. Recently, she has had upper thoracic back pain and sudden onset of bilateral lower extremity weakness and paresthesias. CT scan of C/A/P showed upper thoracic vertebral lesions and spinal cord involvement. There is widespread metastatic disease in lymph nodes, pleural effusions, cardiac effusion. MRI thoracic spine confirmed the involvement in the spine and spinal cord compression. I was asked to see her in consultation for palliative radiation therapy to the thoracic spine. I discussed with her and her  that surgical intervention, if possible would be the quickest approach for relief of this acute problem. After that has occurred, she will be a candidate for radiation therapy. If surgery is not possible, palliative radiation therapy alone is the treatment available to her. Dr. Aldo Perez has seen the patient, and she is being transferred to [facility]. Thank you very much for asking my opinion about her care. Full note in RO system.  Ruthie Post

## 2018-06-19 NOTE — PROGRESS NOTES
Care Management Specialist called and spoke with Radha Johns in [facility] team at [phone]. Radha Johns stated they do not schedule same day trips, only if its a emergency. She stated they do not called [facility] for any transportation. Care Management called [facility] and spoke with LIZ and patient is placed on Will Call. [facility] package put together and taken to [facility] for review.

## 2018-06-19 NOTE — CONSULTS
Note dictated. Relapsed breast ca, spine mets, cord compression, stacie pleural effusion and percardial efffusion, chest wall mets. Spine surgery eval. Rad onc to consider XRT to spine meys, steroids. Echo  MRI brain . Obtain recommendations from recent [place] consult Dr Yaz Cai.

## 2018-06-19 NOTE — CONSULTS
[address]  MR#: [id number]  : 1975  ACCOUNT #: [de-identified]   DATE OF SERVICE: 2018    REFERRING PHYSICIAN:  Dean Kwok MD     REASON FOR EVALUATION:  Relapsed breast cancer, spine mets, cord compression. HISTORY OF PRESENT ILLNESS:  The patient is a 79-year-old woman. She has history of stage III right breast cancer diagnosed in . She then presented with locally advanced right breast cancer that was treated with neoadjuvant chemotherapy followed by bilateral mastectomy by Dr. Josef Tyson. She had poor response to treatment. Post-chemotherapy her tumor size was 9.7 cm and 26/28 axillary lymph nodes were positive for metastasis, ER positive, NC positive, HER-2/senait negative. She then was briefly treated with tamoxifen followed by Lupron and bilateral oophorectomy. She had a brief trial of Aromasin post-oophorectomy. She subsequently stopped her followup with her [facility], Dr. Kaity Pearson, and sought attention with 2 other medical practices at [facility] as well as [facility]. She also sought opinion for alternative medicine with an internist in [facility]. More recently, she began experiencing shortness of breath and was noted to have bilateral pleural effusion and underwent PleurX catheter placement at [facility] in 2018. She was seen by surgery and pulmonary physician and had resection of her right cervical lymph node and right femoral lymph node in 2018. Right femoral node showed metastatic carcinoma compatible with breast primary, ER weakly positive, NC negative, HER-2/senait negative. Right cervical lymph node also demonstrating metastatic carcinoma compatible with breast primary, report # [id number].     She presented to the emergency room yesterday complaining of bilateral leg weakness and paresthesias in both her lower extremities extending to the mid torso and upper thoracic level without bladder or bowel incontinence for the past 2 days. Patient underwent CT scan of the chest, abdomen and pelvis as well as an MRI of the thoracic spine that demonstrates extensive spine mets with T5-T6 cord compression, bilateral pleural effusion, extensive retroperitoneal and mesenteric adenopathy as well as pericardial effusion. The patient has been started on steroids overnight. She feels some improvement in her weakness. We have been asked to evaluate and comment in her treatment management. PAST MEDICAL HISTORY:  Stage III right breast cancer diagnosed in , treated by Dr. Albina Ferraro and Dr. Monie Palumbo, underwent neoadjuvant chemotherapy with poor response to treatment, pathologic stage ypT3, 9.7 cm, ypN3a,  lymph nodes positive, ER positive, AL positive, HER-2/senait negative, premenopausal, treated briefly with tamoxifen followed by addition of Lupron and subsequently switched to Aromasin and bilateral salpingo-oophorectomy. The patient lost to followup, had opinions at [facility] as well as [facility], pursued alternative therapy, bilateral PleurX catheter placement for effusion, right femoral and cervical lymph node biopsy, 2018. BRCA mutation negative. Brief trial of Ibrance and Faslodex a year ago with poor response. FAMILY HISTORY:  Father had small cell lung cancer, now . PAST SURGICAL HISTORY:  MediPort catheter and placement, bilateral mastectomy with reconstruction. Bilateral PleurX catheter placement. SOCIAL HISTORY:  , lives locally. No tobacco or alcohol use. Has 5 children. REVIEW OF SYSTEMS:  Shortness of breath, unquantitated weight loss, bilateral leg weakness with paresthesias extending up to the torso. No loss of bladder or bowel control. No headache or vision problems. Denies chest pain. Appropriate mood and affect.     PHYSICAL EXAMINATION:  GENERAL:  Young woman fairly built, appears chronically ill. Hemodynamically stable. HEENT:  Pupils equal, sclerae are anicteric. Oropharynx without stomatitis. NECK:  Supple. SKIN:  Extensive subcutaneous tumor metastasis extending from the neck, anterior chest wall, upper back, breast bilateral mastectomy and implants in place with reconstruction. LUNGS:  Bilateral PleurX catheter placed, reduced intensity of the breath sounds at lung bases. CARDIOVASCULAR:  First and second heart sound audible. ABDOMEN:  Soft. NEUROLOGIC:  Grade IV power in the lower extremities  Paresthesias below T5 level dermatome. LABORATORY DATA:  CT chest, abdomen and pelvis 06/18/2018, moderate pleural effusion bilaterally, near complete atelectasis left lower lobe, large pericardial effusion, extensive adenopathy in the mediastinum, left axilla, retrocrural, retroperitoneal and mesenteric group. Mild ascites. Heterogeneous liver pattern, extensive lytic lesions in multiple bones including manubrium, sternum, the entire spine at T5-T6 level. Soft tissue mass encroaching onto the spinal cord and the spinal canal.  MRI of the thoracic spine 06/18/2018, extensive spinal metastasis, tumor encroaching the spinal cord at T5-6 level as well as T7-T8 level and to a lesser extent at T3 level. ASSESSMENT:  1.  Relapsed progressive breast cancer with spinal cord compression and bilateral paraparesis. 2.  Bilateral pleural effusion. PleurX catheter in place. 3.  Concerns for pericardial effusion. 4.  Prior history of ER positive, LA positive, HER-2/senait negative breast cancer with poor response to neoadjuvant chemotherapy. 5.  More recent biopsy is weakly ER positive, LA negative, HER-2/senait negative. RECOMMENDATIONS:  I have reviewed the above with the patient. Steroids will continue. We will await neurosurgery evaluation. If she is not a candidate for surgery, palliative radiation to the spine mets to consider.     We will add echocardiogram to assess the pericardial effusion. We will also recommend MRI of the brain to be completed. Tumor marker CA 27-29 or CA 15-3 levels will be checked. Patient would like for consideration of systemic therapy per recent [place] recommendations from her 05/2018 visit. We will obtain records from her visit with Dr. Juliann Abbasi. Overall, prognosis guarded. Thank you, Dr. Avis Freitas, for requesting oncology evaluation.       MD FRANCIA Maravilla / YULIYA  D: 06/19/2018 08:54     T: 06/19/2018 09:31  JOB #: [id number]

## 2018-06-19 NOTE — PROGRESS NOTES
MEWS3 BP 81/54. Pt is asymptomatic. Trying to reach DR Yost x3.  Will continue to monitor pt.    2343 Dr Mace Homans ordered IV bolus 500 ml  And will recheck BP.  0008 BP 93/58

## 2018-06-19 NOTE — DISCHARGE SUMMARY
[facility]  [facility] Summary       Patient: Yoli Iyer Age: 36 y.o. : 1975 MR#: [id number] SSN: [id number]  PCP on record: Pernell Kasper MD  Admit date: 2018  Discharge date: 2018    Consults:  Dr Dieudonne Sloan [place]- spinal surgery  - JACKI Sanchez,-heme/onc  -MATTHEW Mckenzie,- radiation oncology  Procedures: none  -     Significant Diagnostic Studies: MRI 18:  1. Expansile osseous metastatic tumor at multiple thoracic levels  -Extensive extra spinous metastatic disease, best evaluated on the CT chest  abdomen pelvis earlier today  2. There is maximal canal compromise, up to severe cord compression, T5 and T6. Presumed high risk for imminent cord vascular compromise/paraparesis  -Significantly lesser tumoral bone encroachment on canal T3  -Tumor encroaches on some exit foramen  -For correlation sagittal scans with radiotherapy, the most collapsed vertebral  body is T5 (65 % height loss); the T3 vertebral body has about 45 % height loss  3. Very probable edema within spinal cord T5-T7       -  CT chest abd pelvis 18  1. Extensive adenopathy (mediastinum, axillary, para-aortic, retrocrural,  pericardial, retroperitoneal, mesenteric, and groin regions). If tissue  sampling is desired, the right groin region nodes may be the most readily  accessible nodes for sampling.     - Metastasis vs. myeloproliferative or lymphoproliferative disease.     2.  Lytic lesions in the skeletal structures. Similar differential  considerations.     3. Pleural effusion despite adequate position of the pleural catheters.     4.  Large amount of pericardial effusion. Adenopathy in the pericardial fat  pad. These lesions may be contributing to the pericardial effusion.     5.  Mild pleural effusion.     6.   Of particular note is the spinal canal at T5 and T6 level which appears to  be encroached by the compressive changes as well as mass extending from the  lytic foci within the vertebra into the spinal canal.  MR would be helpful for  further delineation and to assess for the extent of spinal canal encroachment.     7.  Low confidence finding of paucity of enhancement in the hepatic vein despite  enhancement in the iliac veins and IVC. Possibly result of slow portal venous  flow vs. reduced cardiac function vs. hepatic venous thrombosis. -Cardiac echo  06/19/18  · Estimated left ventricular ejection fraction is 61 - 65%. Normal left ventricular wall motion, no regional wall motion abnormality noted. Age-appropriate left ventricular diastolic function. · Pericardial effusion measures 2.6 cm (anterior) and 1.7 cm (posterior). Bilateral pleural effusion. · No indications of tamponade present      Moderate sized pericardial effusion. Discharge Diagnoses:                                           Patient Active Problem List   Diagnosis Code    Hypothyroid E03.9    Spinal cord compression due to malignant neoplasm metastatic to spine ([facility] [address] G95.20, C79.51    Metastatic breast cancer [facility]) [address] by Problem     36year old female w/ h/o stage III breast cancer diagnosed in 2014now on presented to the ED w/ c/o bilateral lower extremity weakness that started a few days ago. Also c/o numnbess at times from her lower abdomen down, at times below the knees bilaterally. Denied upper ext weakness/numbness. Imaging studies revealed  \"maximal canal compromise, up to severe cord compression, T5 and T6. Presumed high risk for imminent cord vascular compromise/paraparesis  -Significantly lesser tumoral bone encroachment on canal T3\" as pasted above also. Imaging studies also revealed significant pericardial effusion and pt had cardiac echo w/ results as pasted above. Discussed w/ cards pa, no need for intervention prior to transfer, no evidence of cardiac tamponade at this time.  She will need close f/u of this issue perioperatively and may need cardiology eval prior to surgery. She is currently getting treatment w/ decadron IV. Pt was seen by radiation onc and spinal surgeon w/ recs made for surgical intervention. Pt opted to be transferred to [facility]. She of note has h/o bilateral pleural effusion and underwent pleurX catheter placment at [facility] , CXR revealed during this admission that pt has  \"Intermediate bore pleural catheter are in place bilaterally. Despite the  pleural catheter, there is widening of the pleural space in the left hemithorax,  measuring up to about 1.7 cm. There is opacification of the retrocardiac region  and left lower lung zone. Minimal right costophrenic angle blunting is also  Observed. \"      Despite above findings, however, pt is stable. No evidence of respiratory compromise, she's had a few low bp readings earlier during her stay here but has since had nl bp readings.     Today's examination of the patient revealed:     Subjective:     Objective:   VS:   Visit Vitals    /66 (BP 1 Location: Left arm, BP Patient Position: At rest)    Pulse 97    Temp 99.3 °F (37.4 °C)    Resp 18    Ht 5' 7\" (1.702 m)    Wt 56.7 kg (125 lb)    SpO2 96%    Breastfeeding No    BMI 19.58 kg/m2      Tmax/24hrs: Temp (24hrs), Av.7 °F (36.5 °C), Min:97 °F (36.1 °C), Max:99.3 °F (37.4 °C)     Input/Output:   Intake/Output Summary (Last 24 hours) at 18 1738  Last data filed at 18 0008   Gross per 24 hour   Intake              620 ml   Output             1100 ml   Net             -480 ml       General:  Alert, awake, in nad  Cardiovascular:  Rrr, no murmurs  Pulmonary:  ctab  GI:  Soft, nt, nd  Extremities: no edema   Additional:  No focal findings on neuro exam    Labs:    Recent Results (from the past 24 hour(s))   ECHO ADULT COMPLETE    Collection Time: 18 10:33 AM   Result Value Ref Range    LVIDd 4.47 3.9 - 5.3 cm    LVPWd 1.01 (A) 0.6 - 0.9 cm    LVIDs 2.80 cm    IVSd 1.12 (A) 0.6 - 0.9 cm    LVOT d 2.13 cm LVOT Peak Velocity 97.15 cm/s    LVOT Peak Gradient 3.8 mmHg    LVOT VTI 18.03 cm    Left Ventricle Isovolumic Relaxation Time 78.0 ms    MV A Jake 101.36 cm/s    MV E Jake 0.75 cm/s    MV E/A 0.7     LA Vol 4C 25.03 22 - 52 mL    LA Vol 2C 22.65 22 - 52 mL    LA Area 4C 11.9 cm2    LV Mass .4 (A) 67 - 162 g    LV Mass AL Index 116.2 (A) 43 - 95 g/m2    Mitral Valve E Wave Deceleration Time 104.0 ms    Tapse 1.39 (A) 1.5 - 2.0 cm    LVOT SV 64.3 ml    Ao Root D 3.41 cm    LA Vol Index 13.68 ml/m2    LA Vol Index 15.12 ml/m2    IVC proximal 8.90 cm   METABOLIC PANEL, COMPREHENSIVE    Collection Time: 06/19/18 10:40 AM   Result Value Ref Range    Sodium 140 136 - 145 mmol/L    Potassium 4.6 3.5 - 5.5 mmol/L    Chloride 105 100 - 108 mmol/L    CO2 25 21 - 32 mmol/L    Anion gap 10 3.0 - 18 mmol/L    Glucose 137 (H) 74 - 99 mg/dL    BUN 11 7.0 - 18 MG/DL    Creatinine 0.64 0.6 - 1.3 MG/DL    BUN/Creatinine ratio 17 12 - 20      GFR est AA >60 >60 ml/min/1.73m2    GFR est non-AA >60 >60 ml/min/1.73m2    Calcium 8.2 (L) 8.5 - 10.1 MG/DL    Bilirubin, total 0.1 (L) 0.2 - 1.0 MG/DL    ALT (SGPT) 17 13 - 56 U/L    AST (SGOT) 27 15 - 37 U/L    Alk.  phosphatase 90 45 - 117 U/L    Protein, total 6.9 6.4 - 8.2 g/dL    Albumin 1.8 (L) 3.4 - 5.0 g/dL    Globulin 5.1 (H) 2.0 - 4.0 g/dL    A-G Ratio 0.4 (L) 0.8 - 1.7     LIPID PANEL    Collection Time: 06/19/18 10:40 AM   Result Value Ref Range    LIPID PROFILE          Cholesterol, total 121 <200 MG/DL    Triglyceride 69 <150 MG/DL    HDL Cholesterol 44 40 - 60 MG/DL    LDL, calculated 63.2 0 - 100 MG/DL    VLDL, calculated 13.8 MG/DL    CHOL/HDL Ratio 2.8 0 - 5.0     CBC W/O DIFF    Collection Time: 06/19/18 10:40 AM   Result Value Ref Range    WBC 10.3 4.6 - 13.2 K/uL    RBC 3.70 (L) 4.20 - 5.30 M/uL    HGB 10.0 (L) 12.0 - 16.0 g/dL    HCT 31.0 (L) 35.0 - 45.0 %    MCV 83.8 74.0 - 97.0 FL    MCH 27.0 24.0 - 34.0 PG    MCHC 32.3 31.0 - 37.0 g/dL    RDW 16.7 (H) 11.6 - 14.5 %    PLATELET 526 (H) 265 - 420 K/uL    MPV 9.3 9.2 - 11.8 FL   PTT    Collection Time: 06/19/18 10:40 AM   Result Value Ref Range    aPTT 31.4 23.0 - 36.4 SEC     Additional Data Reviewed:     Condition: stable  Disposition:    []Home   []Home with Home Health   []SNF/NH   []Rehab   []Home with family   []Alternate Facility:___uva_________________      Discharge Medications:     Current Discharge Medication List      START taking these medications    Details   acetaminophen (TYLENOL) 325 mg tablet Take 2 Tabs by mouth every six (6) hours as needed. Qty: 30 Tab, Refills: 0      dexamethasone (DECADRON) 4 mg/mL injection 1 mL by IntraVENous route every six (6) hours. Qty: 1 Vial, Refills: 0      heparin sodium,porcine (HEPARIN, PORCINE,) 5,000 unit/mL injection 1 mL by SubCUTAneous route every eight (8) hours. Qty: 1 mL, Refills: 1      ibuprofen (MOTRIN) 400 mg tablet Take 1 Tab by mouth every eight (8) hours as needed. Qty: 30 Tab, Refills: 1      ondansetron (ZOFRAN) 4 mg/2 mL soln 2 mL by IntraVENous route every six (6) hours as needed. Qty: 1 Vial, Refills: 0         CONTINUE these medications which have NOT CHANGED    Details   gabapentin (NEURONTIN) 300 mg capsule Take 300 mg by mouth three (3) times daily. B.infantis-B.ani-B.long-B.bifi (PROBIOTIC 4X) 10-15 mg TbEC Take  by mouth. naproxen (NAPROSYN) 500 mg tablet Take 1 Tab by mouth two (2) times daily (with meals). Qty: 30 Tab, Refills: 0      multivitamin (ONE A DAY) tablet Take 1 Tab by mouth daily.                Follow-up Appointments:       >30 minutes spent coordinating this discharge (review instructions/follow-up, prescriptions, preparing report for sign off)    Signed:  Dean Rizvi MD  6/19/2018  5:38 PM

## 2018-06-19 NOTE — PROGRESS NOTES
MEWS 4 BP 78/51  Pt is asymptomatic. Dr Mikie Bojorquez made aware no further order rec'd. Will continue to monitor pt.

## 2018-06-19 NOTE — PROGRESS NOTES
Dr. Clarice Faustin at [facility] contacted. Agreed to accept transfer of patient given complexity of case. Will arrange transfer.

## 2018-06-19 NOTE — ROUTINE PROCESS
Bedside shift change report given to Sebastian Lombardi RN (oncoming nurse) by Navid Alvarado (offgoing nurse). Report given with SBAR, Kardex, Intake/Output, MAR and Recent Results.

## 2018-06-19 NOTE — CONSULTS
Consult    Patient: Amber Jernigan MRN: [id number]  SSN: [id number]    YOB: 1975  Age: 36 y.o. Sex: female      Subjective:      Amber Jernigan is a 36 y.o. female who is being seen for metastatic breat cancer. Presented last night with weakness in lower extremities. Studies demonstrate severe compression at t5/6 .  Multiple mets at other levels, bilateral pleural effusions, pericardial effusion, massive lymphadenopathy, minimal pain, no bowel or bladder, 4 year history of progressive CA, has bilateral pleural catheters    Past Medical History:   Diagnosis Date    Back pain     Dr. Lawyer Joby edmondson, discogenic low back pain,exercises    Breast CA ([address] 2014    Dr. Sharie Shone Hypothyroid 2010    Pleural effusion     Postmastectomy lymphedema syndrome     right arm     Past Surgical History:   Procedure Laterality Date    HX BREAST REDUCTION      HX  SECTION      x 4    HX MASTECTOMY Right 2014    Dr. Baldwin Spotted      Family History   Problem Relation Age of Onset    Breast Cancer Maternal Aunt     Cancer Mother      ovarian    Alcohol abuse Father     Heart Disease Father     Hypertension Father      Social History   Substance Use Topics    Smoking status: Never Smoker    Smokeless tobacco: Not on file    Alcohol use No      Current Facility-Administered Medications   Medication Dose Route Frequency Provider Last Rate Last Dose    gabapentin (NEURONTIN) capsule 300 mg  300 mg Oral Q8H Bubba Shelby MD   300 mg at 18 0551    therapeutic multivitamin (THERAGRAN) tablet 1 Tab  1 Tab Oral DAILY Bakari Huddleston MD        acetaminophen (TYLENOL) tablet 650 mg  650 mg Oral Q6H PRN Bakari Huddleston MD        ondansetron [facility]) injection 4 mg  4 mg IntraVENous Q6H PRN Bakari Huddleston MD        0.9% sodium chloride infusion  50 mL/hr IntraVENous CONTINUOUS Zoe Fenton MD 50 mL/hr at 18 1018 50 mL/hr at 18 1018    heparin (porcine) injection 5,000 Units  5,000 Units SubCUTAneous Q8H Brittany Guo MD        ibuprofen (MOTRIN) tablet 400 mg  400 mg Oral Q8H PRN Brittany Guo MD   400 mg at 06/19/18 0411    dexamethasone (DECADRON) 4 mg/mL injection 4 mg  4 mg IntraVENous Q6H Brittany Guo MD   4 mg at 06/19/18 0552        No Known Allergies    Review of Systems:  A comprehensive review of systems was negative except for that written in the History of Present Illness. Objective:     Vitals:    06/19/18 0859 06/19/18 1031 06/19/18 1032 06/19/18 1200   BP: 107/69 107/69  111/66   Pulse: 87   97   Resp: 16   18   Temp: 98.5 °F (36.9 °C)   99.3 °F (37.4 °C)   SpO2: 98%   96%   Weight:  125 lb (56.7 kg) 125 lb (56.7 kg)    Height:  5' 7\" (1.702 m) 5' 7\" (1.702 m)         Physical Exam:  General:  Alert, cooperative, no distress, appears stated age. Eyes:  Left horners   Ears:  Normal TMs and external ear canals both ears. Nose: Nares normal. Septum midline. Mucosa normal. No drainage or sinus tenderness. Mouth/Throat: Lips, mucosa, and tongue normal. Teeth and gums normal.   Neck: Supple, symmetrical, trachea midline, no adenopathy, thyroid: no enlargment/tenderness/nodules, no carotid bruit and no JVD. Back:   Symmetric, no curvature. ROM normal. No CVA tenderness. Lungs:      Heart:     Abdomen:      Extremities:    Pulses:    Skin:    Lymph nodes:    Neurologic: CNII-XII intact. Normal strength, sensation decreased below breasts, normal reflexes, no clonus       Assessment:       I believe patient with impending paraparesis secondary to posterior compressive lesion at T5/6. Has other spinal mets with comp fractures. Uncertain if pericardial effusion requires Rx prior to surgery. High risk, poor long term prognosis, guarded short term prognosis. Hard to believe patient is not already paraplegic. Plan:     Patient would like transfer if possible to tertiary center([facility]) otherwise to go ahead with surgery.  RBCA discussed.   Will contact [facility]      Signed By: Ozzy Burch MD     June 19, 2018

## 2018-06-19 NOTE — PROGRESS NOTES
Problem: Falls - Risk of  Goal: *Absence of Falls  Document Fermin Fall Risk and appropriate interventions in the flowsheet.    Outcome: Progressing Towards Goal  Fall Risk Interventions:  Mobility Interventions: Communicate number of staff needed for ambulation/transfer         Medication Interventions: Evaluate medications/consider consulting pharmacy

## 2018-06-19 NOTE — PROGRESS NOTES
conducted an initial consultation and Spiritual Assessment for Leti Butts, who is a 36 y.o.,female. Patients Primary Language is: [place]. According to the patients EMR Roman Catholic Affiliation is: [place]. The reason the Patient came to the hospital is:   Patient Active Problem List    Diagnosis Date Noted    Spinal cord compression due to malignant neoplasm metastatic to spine ([facility] [address] 06/18/2018    Metastatic breast cancer ([facility] 06/18/2018    Hypothyroid 01/04/2010        The  provided the following Interventions:  Initiated a relationship of care and support. Explored issues of lenard, spirituality and/or Hoahaoism needs while hospitalized. Listened empathically. Provided chaplaincy education. Provided information about Spiritual Care Services. Offered prayer and assurance of continued prayers on patient's behalf. Chart reviewed. The following outcomes were achieved:  Patient shared some information about their medical narrative and spiritual journey/beliefs. Patient processed feeling about current hospitalization. Patient expressed gratitude for the 's visit. Assessment:  Patient did not indicate any spiritual or Hoahaoism issues which require Spiritual Care Services interventions at this time. Patient does not have any Hoahaoism/cultural needs that will affect patients preferences in health care. Plan:  Chaplains will continue to follow and will provide pastoral care on an as needed or requested basis.  recommends bedside caregivers page  on duty if patient shows signs of acute spiritual or emotional distress.   Michael Almeida, [facility]  Spiritual Care  [phone]

## 2018-06-20 LAB — CANCER AG15-3 SERPL-ACNC: 177.8 U/ML (ref 0–25)

## 2018-06-20 NOTE — PROGRESS NOTES
Pt transferred to [facility], EMTALA form filled out and signed. Pt stable, in no pain, ambulated to stretcher with walker. Reported called to Mary Nogueira RN at [facility] earlier in shift.

## 2019-03-15 ENCOUNTER — HOSPITAL ENCOUNTER (OUTPATIENT)
Dept: PHYSICAL THERAPY | Age: 44
Discharge: HOME | End: 2019-03-15
Payer: COMMERCIAL

## 2019-03-15 PROCEDURE — 97140 MANUAL THERAPY 1/> REGIONS: CPT

## 2019-03-15 PROCEDURE — 97110 THERAPEUTIC EXERCISES: CPT

## 2019-03-15 PROCEDURE — 97162 PT EVAL MOD COMPLEX 30 MIN: CPT

## 2019-03-15 PROCEDURE — 97535 SELF CARE MNGMENT TRAINING: CPT

## 2019-03-15 NOTE — PROGRESS NOTES
[address] [facility] PHYSICAL THERAPY  [address] [facility] [address], [address] - Phone: [phone]  Fax: [phone] / 9966 [facility]  Patient Name: Mickie Grande : 1975   Medical   Diagnosis: Lymphedema of right upper extremity [I89.0] Treatment Diagnosis: Right UE Lymphedema   Onset Date: Chronic      Referral Source: Rui Zuniga MD [facility]): 3/15/2019   Prior Hospitalization: See medical history Provider #: [id number]   Prior Level of Function: Independent with ADL's   Comorbidities: mastectomy , breast reconstruction , pleural drains Summer 2018, UE port right UE Summer 2018, hypothyroid    Medications: Verified on Patient Summary List   The Plan of Care and following information is based on the information from the initial evaluation.   ===========================================================================================  Assessment / key information:  Mickie Grande is a 39 y.o.  female with Dx: Lymphedema of right upper extremity [I89.0], signs and symptoms consistent with grade two lymphedema. Pt reports to PT with chronic right UE lymphedema with an exacerbation that started several months ago. The pt was first diagnosed with metastatic breast CA in  and had a radical mastectomy 2014 with total AND. The pt first was not symptomatic with lymphedema until following a return diagnosis of metastatic breast CA in . The pt received chemotherapy and radiation on and off for several months when MET's to the T/S were detected in 2018. The pt had surgery to remove the tumor's and surrounding bone from high T/S roughly from T2-T8. Prior to surgery, the pt experienced paraplegia which recovered while she was hospitalized on and off for 6 months due to pleural effusions and need for a new port placed in the right arm.  The pt reports she had a pulmonary embolus that was treated which ultimately lead to right UE symptoms. Objective: The pt demonstrates 2+ pitting edema in the right UE with minimal skin changes other than warts across the dorsum of the right hand. The pt is limited approximately 50% in digital flexion in all digits. The pt demonstrates no AROM deficits at this time and is only mildly limited (gross 4-/5 in the right vs 4/5 in the left) in UE strength. Girth (cm): Right UE  3/15/19 Left UE  3/15/19   Palm:  19 18   Wrist:             17 15   Forearm: (19 cm from wrist) 28 23.5   Elbow: 27.5 23.5   Bicep:     (9 cm from elbow) 31 25   Axilla: 34 30      The patient would benefit from skilled PT to address the below listed impairments.  Thank you for your referral.  ===========================================================================================  Eval Complexity: History: HIGH Complexity :3+ comorbidities / personal factors will impact the outcome/ POC Exam:MEDIUM Complexity : 3 Standardized tests and measures addressing body structure, function, activity limitation and / or participation in recreation  Presentation: MEDIUM Complexity : Evolving with changing characteristics  Clinical Decision Making:MEDIUM Complexity : FOTO score of 26-74Overall Complexity:MEDIUM    Problem List: pain affecting function, decrease strength, edema affecting function, decrease ADL/ functional abilitiies, decrease activity tolerance and decrease flexibility/ joint mobility   Treatment Plan may include any combination of the following: Therapeutic exercise, Therapeutic activities, Physical agent/modality, Manual therapy, Patient education, Self Care training and Functional mobility training    Treatment Protocol:    o Manual Lymphatic Drainage   o Soft Tissue Mobilization/MFR   o Compression Bandaging   o Prescription of Pneumatic Compression Pump   o Decongestive Exercises/Self MFR/Strengthening   o Education   o Compression Garment    Patient / Family readiness to learn indicated by: asking questions, trying to perform skills and interest  Persons(s) to be included in education: patient (P)  Barriers to Learning/Limitations: None  Measures taken: na   Patient Goal (s): \"For my arm to finally get better. To be able to  my hand better. \"   Patient self reported health status: fair  Rehabilitation Potential: good  Goals:    Long Term Goals: To be accomplished in 3-4 treatments. 1) Pt will require verbal cues 0-25% of the time with performance of her HEP to improve symptoms at home. 2) Pt will be Independent in wearing schedule of light compression. 3) Patient will demonstrate decongestion of limb by 3% to improve efficiency with dressing ADL's.  4)  Pt will be Independent with compression management routine to maintain UE girth measures. Frequency / Duration:   Patient to be seen  2-3  times per week for 3-4  treatments:  Patient / Caregiver education and instruction: self care, activity modification and exercises    Therapist Signature: Teresa Chan DPT Date: 1/03/5326   Certification Period: NA Time: 4:56 PM   ===========================================================================================  I certify that the above Physical Therapy Services are being furnished while the patient is under my care. I agree with the treatment plan and certify that this therapy is necessary. Physician Signature:        Date:       Time:     Please sign and return to [employer] or you may fax the signed copy to [phone]. Thank you.

## 2019-03-15 NOTE — PROGRESS NOTES
Lymphedema EVAL/DAILY NOTE    Patient Name: Waynard Hashimoto  Date:3/15/2019  : 1975  [x]  Patient  Verified  Payor: [place] / [facility] [place] [employer]  [facility] Type: Commerical /    In time:10:00  Out time:11:00  Total Treatment Time (min): 60  Total Timed Codes (min): NA  1:1 Treatment Time (MC/BCBS only): NA   Visit #: 1     Referring Provider: Naty Hastings MD  Next Appointment: Unknown  Treatment Area: Lymphedema of right upper extremity [I89.0]    Pain Level (0-10 scale): 7/10    Personal Goal:  \"For my arm to finally get better. To be able to  my hand better. \"    Home Situation (DME-pump, family suppor):, no pump or garments    Dominant Hand: right handed    Current or Previous Compression Garment(s):   []Stocking []Sleeve  [] Pantyhose                          [] Glove/gauntlet/toe                           []Brand:                          []mmHg:   [] Size:     Compression Pump:  []Brand:                                     []Date Prescribed:    Has your activity changed since diagnosis? yes    Subjective functional status:     Present Symptoms/History: Pt reports to PT with chronic right UE lymphedema with an exacerbation that started several months ago. The pt was first diagnosed with metastatic breast CA in  and had a radical mastectomy 2014 with total AND. The pt first was not symptomatic with lymphedema until following a return diagnosis of metastatic breast CA in . The pt received chemotherapy and radiation on and off for several months when MET's to the T/S were detected in 2018. The pt had surgery to remove the tumor's and surrounding bone from high T/S roughly from T2-T8. Prior to surgery, the pt experienced paraplegia which recovered while she was hospitalized on and off for 6 months due to pleural effusions and need for a new port placed in the right arm.  The pt reports she had a pulmonary embolus that was treated which ultimately lead to right UE symptoms. Medical Oncologist:    Radiation Oncologist:    Primary Care Physician: Diana Huerta MD    Date of first cancer diagnosis/type/stage: 2014 metastatic breast CA    Surgery: 2014   Radiation:   Type   Field  Date of Completion   Number of Treatments                   Side Effects Encountered:     Chemotherapy:    Dates    Side Effects Encountered: Other Treatment:(clinical trial or genetic counseling)    Recurrence      Precautions/Indications:     Diagnostic tests (imaging/bone scan/labs):        OBJECTIVE:   Edema:  []min []moderate  [] severe [] pitting  Circumferences:     Girth (cm): Right UE  3/15/19 Left UE  3/15/19   Palm:  19 18   Wrist:  17 15   Forearm: (19 cm from wrist) 28 23.5   Elbow: 27.5 23.5   Bicep:     (9 cm from elbow) 31 25   Axilla: 34 30                                              Skin Integrity      Sensation  []normal [] sensitive  [] decreased  Light touch/Sharp/Dull                Color []normal []red  [] pink               Scars/Burns/incisions:                Wounds: location:                      size:               depth:  Posture:    ROM:    Strength:    Breath Pattern Assessment:   Diaphragm___________________   Anterior Chest________________   Accessory Muscle Use______________      36 min []Eval                  []Re-Eval       8 min Therapeutic Exercise:  [] See flow sheet :   Rationale: increase ROM and increase strength to improve the patients ability to perform ADL's with improving lymphatic ciculation. 8 min Manual Therapy:  Girth measurements, demonstration of manual lymph drainage. Rationale: decrease pain, increase ROM, increase tissue extensibility and decrease edema  to perform ADL's with improved fine motor dexterity. 8 min Self Care/Home Management: skin care routine education, compression garment education   Rationale: { to improve the patients skin health in order to prevent infection.           With [] TE   [] TA   [] neuro   [] other: Patient Education: [x] Review HEP    [] Progressed/Changed HEP based on:   [] positioning   [] body mechanics   [] transfers   [] heat/ice application    [] other:      Other Objective/Functional Measures: NA     Pain Level (0-10 scale) post treatment: No change    ASSESSMENT/Changes in Function: See POC    Justification for Eval Code Complexity: MODERATE  Patient History (low 0, mod 1-2, high 3-4): mastectomy 2014, breast reconstruction 2015, pleural drains Summer 2018, UE port right UE Summer 2018 HIGH   Examination (low 1-2, mod 3+, high 4+): UE AROM, UE MMT, UE girth measurements  MODERATE  Clinical Presentation (low: stable/uncomplicated; mod: evolving; high: unstable/unpredictable): evolving symptoms with manual lymph drainage MODERATE  Clinical Decision M aking (low , mod 26-74, high 1-25): grade two lymphedema MODERATE           PLAN  []  Upgrade activities as tolerated     []  Continue plan of care  []  Update interventions per flow sheet       []  Discharge due to:_  []  Other:_      Hans Varela 3/15/2019  10:09 AM

## 2019-03-27 ENCOUNTER — APPOINTMENT (OUTPATIENT)
Dept: PHYSICAL THERAPY | Age: 44
End: 2019-03-27
Payer: COMMERCIAL

## 2019-04-08 NOTE — PROGRESS NOTES
[address] [facility] PHYSICAL THERAPY 
[address] [facility] [address], [place], [address] - Phone: [phone]  Fax: [phone] Dear Dr. Brain Glover MD, Under your direction, we have been providing physical therapy for your patient Alex Vazquez, for a diagnosis of Lymphedema of right upper extremity [I89.0]. The patient was scheduled for one visits after her initial evaluation. She attended none of her follow up sessions, and was last seen on 3/15/19 for her evaluation. [facility] has not communicated with us her intentions regarding PT. The pt did receive instructions and guidance to purchase a UE compression sleeve in preparation for her impending flight and vacation; however, after her vacation the pt has been noncompliant in attendance and returning the office's calls. Due to the inability to further her care from non-compliance to our attendance policy and POC, we are discharging the patient from physical therapy at this time. .  
 
We appreciate the kind referral and would willingly work with this patient again, should she be able to arrange regular attendance and is appropriate for further treatment. Your patient's health is our primary concern. If you have any questions/comments please contact us directly at [phone]. Thank you for allowing us to assist in the care of your patient. Therapist Signature: Koby Schroeder DPT Date: 4/8/2019 Reporting Period 3/15/19 - 4/8/19 Time: 5:40 PM  
NOTE TO PHYSICIAN:  PLEASE COMPLETE THE ORDERS BELOW AND FAX TO [facility] Physical Therapy: [phone] If you are unable to process this request in 24 hours please contact our office: [phone] 
 
___ I have read the above report and request that my patient continue as recommended.  
___ I have read the above report and request that my patient continue therapy with the following changes/special instructions:_________________________________________________________  
___ I have read the above report and request that my patient be discharged from therapy. Physician Signature:                                                                                            Date:                                                                     Time:                                                          
Please sign and return to [employer] or you may fax the signed copy to [phone].   Thank you.

## 2021-03-27 ENCOUNTER — APPOINTMENT (OUTPATIENT)
Dept: RADIATION THERAPY | Age: 46
End: 2021-03-27

## 2021-03-31 ENCOUNTER — HOSPITAL ENCOUNTER (OUTPATIENT)
Dept: RADIATION THERAPY | Age: 46
Discharge: HOME | End: 2021-03-31
Payer: COMMERCIAL

## 2021-03-31 PROCEDURE — 99211 OFF/OP EST MAY X REQ PHY/QHP: CPT

## 2021-04-21 ENCOUNTER — TRANSCRIBE ORDER (OUTPATIENT)
Dept: SCHEDULING | Age: 46
End: 2021-04-21

## 2021-04-21 DIAGNOSIS — C79.52 SECONDARY MALIGNANT NEOPLASM OF BONE AND BONE MARROW (HCC): Primary | ICD-10-CM

## 2021-04-21 DIAGNOSIS — C79.51 SECONDARY MALIGNANT NEOPLASM OF BONE AND BONE MARROW (HCC): Primary | ICD-10-CM

## 2021-04-22 ENCOUNTER — HOSPITAL ENCOUNTER (OUTPATIENT)
Dept: RADIATION THERAPY | Age: 46
Discharge: HOME | End: 2021-04-22
Payer: COMMERCIAL

## 2021-04-22 ENCOUNTER — HOSPITAL ENCOUNTER (OUTPATIENT)
Dept: CT IMAGING | Age: 46
Discharge: HOME | End: 2021-04-22
Attending: RADIOLOGY
Payer: COMMERCIAL

## 2021-04-22 DIAGNOSIS — C79.51 SECONDARY MALIGNANT NEOPLASM OF BONE AND BONE MARROW (HCC): ICD-10-CM

## 2021-04-22 DIAGNOSIS — C79.52 SECONDARY MALIGNANT NEOPLASM OF BONE AND BONE MARROW (HCC): ICD-10-CM

## 2021-04-22 PROCEDURE — 71250 CT THORAX DX C-: CPT

## 2021-04-23 ENCOUNTER — HOSPITAL ENCOUNTER (OUTPATIENT)
Dept: RADIATION THERAPY | Age: 46
Discharge: HOME | End: 2021-04-23
Payer: COMMERCIAL

## 2021-04-23 PROCEDURE — 99211 OFF/OP EST MAY X REQ PHY/QHP: CPT

## 2021-04-26 ENCOUNTER — TRANSCRIBE ORDER (OUTPATIENT)
Dept: SCHEDULING | Age: 46
End: 2021-04-26

## 2021-04-28 ENCOUNTER — HOSPITAL ENCOUNTER (OUTPATIENT)
Dept: RADIATION THERAPY | Age: 46
Discharge: HOME | End: 2021-04-28
Payer: COMMERCIAL

## 2021-04-28 PROCEDURE — 77334 RADIATION TREATMENT AID(S): CPT

## 2021-04-28 PROCEDURE — 77331 SPECIAL RADIATION DOSIMETRY: CPT

## 2021-04-28 PROCEDURE — 77321 SPECIAL TELETX PORT PLAN: CPT

## 2021-04-28 PROCEDURE — 77295 3-D RADIOTHERAPY PLAN: CPT

## 2021-04-28 PROCEDURE — 77333 RADIATION TREATMENT AID(S): CPT

## 2021-04-29 ENCOUNTER — HOSPITAL ENCOUNTER (OUTPATIENT)
Dept: RADIATION THERAPY | Age: 46
Discharge: HOME | End: 2021-04-29
Payer: COMMERCIAL

## 2021-04-29 PROCEDURE — 77334 RADIATION TREATMENT AID(S): CPT

## 2021-04-29 PROCEDURE — 77412 RADIATION TX DELIVERY LVL 3: CPT

## 2021-04-29 PROCEDURE — 77300 RADIATION THERAPY DOSE PLAN: CPT

## 2021-04-29 PROCEDURE — 77295 3-D RADIOTHERAPY PLAN: CPT

## 2021-04-30 ENCOUNTER — HOSPITAL ENCOUNTER (OUTPATIENT)
Dept: RADIATION THERAPY | Age: 46
Discharge: HOME | End: 2021-04-30
Payer: COMMERCIAL

## 2021-04-30 PROCEDURE — 77412 RADIATION TX DELIVERY LVL 3: CPT

## 2021-04-30 PROCEDURE — 77280 THER RAD SIMULAJ FIELD SMPL: CPT

## 2021-05-03 ENCOUNTER — HOSPITAL ENCOUNTER (OUTPATIENT)
Dept: RADIATION THERAPY | Age: 46
Discharge: HOME | End: 2021-05-03
Payer: COMMERCIAL

## 2021-05-03 PROCEDURE — 77412 RADIATION TX DELIVERY LVL 3: CPT

## 2021-05-04 ENCOUNTER — HOSPITAL ENCOUNTER (OUTPATIENT)
Dept: RADIATION THERAPY | Age: 46
Discharge: HOME | End: 2021-05-04
Payer: COMMERCIAL

## 2021-05-04 PROCEDURE — 77412 RADIATION TX DELIVERY LVL 3: CPT

## 2021-05-05 ENCOUNTER — HOSPITAL ENCOUNTER (OUTPATIENT)
Dept: RADIATION THERAPY | Age: 46
Discharge: HOME | End: 2021-05-05
Payer: COMMERCIAL

## 2021-05-05 PROCEDURE — 77412 RADIATION TX DELIVERY LVL 3: CPT

## 2021-05-06 ENCOUNTER — HOSPITAL ENCOUNTER (OUTPATIENT)
Dept: RADIATION THERAPY | Age: 46
Discharge: HOME | End: 2021-05-06
Payer: COMMERCIAL

## 2021-05-06 PROCEDURE — 77412 RADIATION TX DELIVERY LVL 3: CPT

## 2021-05-06 PROCEDURE — 77336 RADIATION PHYSICS CONSULT: CPT

## 2021-05-07 ENCOUNTER — APPOINTMENT (OUTPATIENT)
Dept: RADIATION THERAPY | Age: 46
End: 2021-05-07
Payer: COMMERCIAL

## 2021-05-10 ENCOUNTER — APPOINTMENT (OUTPATIENT)
Dept: RADIATION THERAPY | Age: 46
End: 2021-05-10
Payer: COMMERCIAL

## 2021-05-11 ENCOUNTER — APPOINTMENT (OUTPATIENT)
Dept: RADIATION THERAPY | Age: 46
End: 2021-05-11
Payer: COMMERCIAL

## 2021-05-12 ENCOUNTER — APPOINTMENT (OUTPATIENT)
Dept: RADIATION THERAPY | Age: 46
End: 2021-05-12
Payer: COMMERCIAL

## 2021-05-13 ENCOUNTER — APPOINTMENT (OUTPATIENT)
Dept: RADIATION THERAPY | Age: 46
End: 2021-05-13
Payer: COMMERCIAL

## 2021-05-14 ENCOUNTER — APPOINTMENT (OUTPATIENT)
Dept: RADIATION THERAPY | Age: 46
End: 2021-05-14
Payer: COMMERCIAL

## 2021-05-17 ENCOUNTER — APPOINTMENT (OUTPATIENT)
Dept: RADIATION THERAPY | Age: 46
End: 2021-05-17
Payer: COMMERCIAL

## 2021-05-18 ENCOUNTER — HOSPITAL ENCOUNTER (OUTPATIENT)
Dept: RADIATION THERAPY | Age: 46
Discharge: HOME | End: 2021-05-18
Payer: COMMERCIAL

## 2021-05-18 PROCEDURE — 77332 RADIATION TREATMENT AID(S): CPT

## 2021-05-18 PROCEDURE — 77290 THER RAD SIMULAJ FIELD CPLX: CPT

## 2021-05-19 ENCOUNTER — APPOINTMENT (OUTPATIENT)
Dept: RADIATION THERAPY | Age: 46
End: 2021-05-19
Payer: COMMERCIAL

## 2021-05-20 ENCOUNTER — APPOINTMENT (OUTPATIENT)
Dept: RADIATION THERAPY | Age: 46
End: 2021-05-20
Payer: COMMERCIAL

## 2021-05-21 ENCOUNTER — HOSPITAL ENCOUNTER (OUTPATIENT)
Dept: RADIATION THERAPY | Age: 46
Discharge: HOME | End: 2021-05-21
Payer: COMMERCIAL

## 2021-05-21 PROCEDURE — 77334 RADIATION TREATMENT AID(S): CPT

## 2021-05-21 PROCEDURE — 77300 RADIATION THERAPY DOSE PLAN: CPT

## 2021-05-21 PROCEDURE — 77295 3-D RADIOTHERAPY PLAN: CPT

## 2021-05-25 ENCOUNTER — HOSPITAL ENCOUNTER (OUTPATIENT)
Dept: RADIATION THERAPY | Age: 46
Discharge: HOME | End: 2021-05-25
Payer: COMMERCIAL

## 2021-05-25 PROCEDURE — 77412 RADIATION TX DELIVERY LVL 3: CPT

## 2021-05-25 PROCEDURE — 77280 THER RAD SIMULAJ FIELD SMPL: CPT

## 2021-05-26 ENCOUNTER — HOSPITAL ENCOUNTER (OUTPATIENT)
Dept: RADIATION THERAPY | Age: 46
Discharge: HOME | End: 2021-05-26
Payer: COMMERCIAL

## 2021-05-26 PROCEDURE — 77412 RADIATION TX DELIVERY LVL 3: CPT

## 2021-05-27 ENCOUNTER — HOSPITAL ENCOUNTER (OUTPATIENT)
Dept: RADIATION THERAPY | Age: 46
Discharge: HOME | End: 2021-05-27
Payer: COMMERCIAL

## 2021-05-27 PROCEDURE — 77412 RADIATION TX DELIVERY LVL 3: CPT

## 2021-06-01 ENCOUNTER — HOSPITAL ENCOUNTER (OUTPATIENT)
Dept: RADIATION THERAPY | Age: 46
Discharge: HOME | End: 2021-06-01
Payer: COMMERCIAL

## 2021-06-01 ENCOUNTER — HOSPITAL ENCOUNTER (OUTPATIENT)
Dept: GENERAL RADIOLOGY | Age: 46
Discharge: HOME | End: 2021-06-01
Payer: COMMERCIAL

## 2021-06-01 DIAGNOSIS — R06.89 DECREASED BREATH SOUNDS: ICD-10-CM

## 2021-06-01 PROCEDURE — 77412 RADIATION TX DELIVERY LVL 3: CPT

## 2021-06-01 PROCEDURE — 71046 X-RAY EXAM CHEST 2 VIEWS: CPT

## 2021-06-02 ENCOUNTER — HOSPITAL ENCOUNTER (OUTPATIENT)
Dept: RADIATION THERAPY | Age: 46
Discharge: HOME | End: 2021-06-02
Payer: COMMERCIAL

## 2021-06-02 PROCEDURE — 77412 RADIATION TX DELIVERY LVL 3: CPT

## 2021-06-02 PROCEDURE — 77417 THER RADIOLOGY PORT IMAGE(S): CPT

## 2021-06-03 ENCOUNTER — APPOINTMENT (OUTPATIENT)
Dept: RADIATION THERAPY | Age: 46
End: 2021-06-03
Payer: COMMERCIAL

## 2021-06-04 ENCOUNTER — HOSPITAL ENCOUNTER (OUTPATIENT)
Dept: RADIATION THERAPY | Age: 46
Discharge: HOME | End: 2021-06-04
Payer: COMMERCIAL

## 2021-06-04 PROCEDURE — 77412 RADIATION TX DELIVERY LVL 3: CPT

## 2021-06-08 ENCOUNTER — TRANSCRIBE ORDER (OUTPATIENT)
Dept: SCHEDULING | Age: 46
End: 2021-06-08

## 2021-06-08 ENCOUNTER — HOSPITAL ENCOUNTER (OUTPATIENT)
Dept: RADIATION THERAPY | Age: 46
Discharge: HOME | End: 2021-06-08
Payer: COMMERCIAL

## 2021-06-08 DIAGNOSIS — J91.0 MALIGNANT PLEURAL EFFUSION: ICD-10-CM

## 2021-06-08 DIAGNOSIS — C50.919 METASTATIC BREAST CANCER (HCC): Primary | ICD-10-CM

## 2021-06-08 PROCEDURE — 77412 RADIATION TX DELIVERY LVL 3: CPT

## 2021-06-09 ENCOUNTER — HOSPITAL ENCOUNTER (OUTPATIENT)
Dept: RADIATION THERAPY | Age: 46
Discharge: HOME | End: 2021-06-09
Payer: COMMERCIAL

## 2021-06-09 PROCEDURE — 77412 RADIATION TX DELIVERY LVL 3: CPT

## 2021-06-10 ENCOUNTER — HOSPITAL ENCOUNTER (OUTPATIENT)
Dept: RADIATION THERAPY | Age: 46
Discharge: HOME | End: 2021-06-10
Payer: COMMERCIAL

## 2021-06-11 ENCOUNTER — HOSPITAL ENCOUNTER (OUTPATIENT)
Dept: RADIATION THERAPY | Age: 46
Discharge: HOME | End: 2021-06-11
Payer: COMMERCIAL

## 2021-06-11 PROCEDURE — 99211 OFF/OP EST MAY X REQ PHY/QHP: CPT

## 2021-06-11 PROCEDURE — 77290 THER RAD SIMULAJ FIELD CPLX: CPT

## 2021-06-11 PROCEDURE — 77336 RADIATION PHYSICS CONSULT: CPT

## 2021-06-11 PROCEDURE — 77412 RADIATION TX DELIVERY LVL 3: CPT

## 2021-06-11 PROCEDURE — 77332 RADIATION TREATMENT AID(S): CPT

## 2021-06-14 ENCOUNTER — HOSPITAL ENCOUNTER (OUTPATIENT)
Dept: RADIATION THERAPY | Age: 46
Discharge: HOME | End: 2021-06-14
Payer: COMMERCIAL

## 2021-06-14 PROCEDURE — 77334 RADIATION TREATMENT AID(S): CPT

## 2021-06-14 PROCEDURE — 77412 RADIATION TX DELIVERY LVL 3: CPT

## 2021-06-14 PROCEDURE — 77300 RADIATION THERAPY DOSE PLAN: CPT

## 2021-06-14 PROCEDURE — 77295 3-D RADIOTHERAPY PLAN: CPT

## 2021-06-15 ENCOUNTER — APPOINTMENT (OUTPATIENT)
Dept: RADIATION THERAPY | Age: 46
End: 2021-06-15
Payer: COMMERCIAL

## 2021-06-16 ENCOUNTER — APPOINTMENT (OUTPATIENT)
Dept: RADIATION THERAPY | Age: 46
End: 2021-06-16
Payer: COMMERCIAL

## 2021-06-17 ENCOUNTER — APPOINTMENT (OUTPATIENT)
Dept: RADIATION THERAPY | Age: 46
End: 2021-06-17
Payer: COMMERCIAL

## 2021-06-18 ENCOUNTER — APPOINTMENT (OUTPATIENT)
Dept: RADIATION THERAPY | Age: 46
End: 2021-06-18
Payer: COMMERCIAL

## 2021-06-21 ENCOUNTER — APPOINTMENT (OUTPATIENT)
Dept: RADIATION THERAPY | Age: 46
End: 2021-06-21
Payer: COMMERCIAL